# Patient Record
Sex: MALE | Race: WHITE | Employment: FULL TIME | URBAN - METROPOLITAN AREA
[De-identification: names, ages, dates, MRNs, and addresses within clinical notes are randomized per-mention and may not be internally consistent; named-entity substitution may affect disease eponyms.]

---

## 2019-06-27 ENCOUNTER — APPOINTMENT (OUTPATIENT)
Dept: CT IMAGING | Facility: CLINIC | Age: 34
End: 2019-06-27
Attending: EMERGENCY MEDICINE
Payer: COMMERCIAL

## 2019-06-27 ENCOUNTER — APPOINTMENT (OUTPATIENT)
Dept: ULTRASOUND IMAGING | Facility: CLINIC | Age: 34
End: 2019-06-27
Attending: EMERGENCY MEDICINE
Payer: COMMERCIAL

## 2019-06-27 ENCOUNTER — HOSPITAL ENCOUNTER (EMERGENCY)
Facility: CLINIC | Age: 34
Discharge: HOME | End: 2019-06-27
Attending: EMERGENCY MEDICINE | Admitting: EMERGENCY MEDICINE
Payer: COMMERCIAL

## 2019-06-27 VITALS
RESPIRATION RATE: 11 BRPM | OXYGEN SATURATION: 99 % | DIASTOLIC BLOOD PRESSURE: 97 MMHG | HEIGHT: 70 IN | BODY MASS INDEX: 45.1 KG/M2 | WEIGHT: 315 LBS | TEMPERATURE: 98.8 F | SYSTOLIC BLOOD PRESSURE: 138 MMHG

## 2019-06-27 DIAGNOSIS — R91.8 PULMONARY NODULES: ICD-10-CM

## 2019-06-27 DIAGNOSIS — R07.9 CHEST PAIN, UNSPECIFIED TYPE: ICD-10-CM

## 2019-06-27 LAB
ANION GAP SERPL CALCULATED.3IONS-SCNC: 7 MMOL/L (ref 3–14)
BASOPHILS # BLD AUTO: 0 10E9/L (ref 0–0.2)
BASOPHILS NFR BLD AUTO: 0.4 %
BUN SERPL-MCNC: 12 MG/DL (ref 7–30)
CALCIUM SERPL-MCNC: 9.5 MG/DL (ref 8.5–10.1)
CHLORIDE SERPL-SCNC: 109 MMOL/L (ref 94–109)
CO2 SERPL-SCNC: 28 MMOL/L (ref 20–32)
CREAT SERPL-MCNC: 1.07 MG/DL (ref 0.66–1.25)
D DIMER PPP FEU-MCNC: 0.7 UG/ML FEU (ref 0–0.5)
DIFFERENTIAL METHOD BLD: NORMAL
EOSINOPHIL # BLD AUTO: 0.1 10E9/L (ref 0–0.7)
EOSINOPHIL NFR BLD AUTO: 1.3 %
ERYTHROCYTE [DISTWIDTH] IN BLOOD BY AUTOMATED COUNT: 12.3 % (ref 10–15)
GFR SERPL CREATININE-BSD FRML MDRD: 89 ML/MIN/{1.73_M2}
GLUCOSE SERPL-MCNC: 105 MG/DL (ref 70–99)
HCT VFR BLD AUTO: 45 % (ref 40–53)
HGB BLD-MCNC: 15.8 G/DL (ref 13.3–17.7)
IMM GRANULOCYTES # BLD: 0 10E9/L (ref 0–0.4)
IMM GRANULOCYTES NFR BLD: 0.1 %
INTERPRETATION ECG - MUSE: NORMAL
LYMPHOCYTES # BLD AUTO: 4.3 10E9/L (ref 0.8–5.3)
LYMPHOCYTES NFR BLD AUTO: 45.7 %
MCH RBC QN AUTO: 32.1 PG (ref 26.5–33)
MCHC RBC AUTO-ENTMCNC: 35.1 G/DL (ref 31.5–36.5)
MCV RBC AUTO: 92 FL (ref 78–100)
MONOCYTES # BLD AUTO: 0.8 10E9/L (ref 0–1.3)
MONOCYTES NFR BLD AUTO: 8.3 %
NEUTROPHILS # BLD AUTO: 4.2 10E9/L (ref 1.6–8.3)
NEUTROPHILS NFR BLD AUTO: 44.2 %
NRBC # BLD AUTO: 0 10*3/UL
NRBC BLD AUTO-RTO: 0 /100
NT-PROBNP SERPL-MCNC: 32 PG/ML (ref 0–450)
PLATELET # BLD AUTO: 215 10E9/L (ref 150–450)
POTASSIUM SERPL-SCNC: 4 MMOL/L (ref 3.4–5.3)
RBC # BLD AUTO: 4.92 10E12/L (ref 4.4–5.9)
SODIUM SERPL-SCNC: 144 MMOL/L (ref 133–144)
TROPONIN I SERPL-MCNC: <0.015 UG/L (ref 0–0.04)
WBC # BLD AUTO: 9.4 10E9/L (ref 4–11)

## 2019-06-27 PROCEDURE — 99285 EMERGENCY DEPT VISIT HI MDM: CPT | Mod: 25

## 2019-06-27 PROCEDURE — 25000125 ZZHC RX 250: Performed by: EMERGENCY MEDICINE

## 2019-06-27 PROCEDURE — 84484 ASSAY OF TROPONIN QUANT: CPT | Performed by: EMERGENCY MEDICINE

## 2019-06-27 PROCEDURE — 93971 EXTREMITY STUDY: CPT | Mod: RT

## 2019-06-27 PROCEDURE — 85025 COMPLETE CBC W/AUTO DIFF WBC: CPT | Performed by: EMERGENCY MEDICINE

## 2019-06-27 PROCEDURE — 71260 CT THORAX DX C+: CPT

## 2019-06-27 PROCEDURE — 80048 BASIC METABOLIC PNL TOTAL CA: CPT | Performed by: EMERGENCY MEDICINE

## 2019-06-27 PROCEDURE — 85379 FIBRIN DEGRADATION QUANT: CPT | Performed by: EMERGENCY MEDICINE

## 2019-06-27 PROCEDURE — 25000128 H RX IP 250 OP 636: Performed by: EMERGENCY MEDICINE

## 2019-06-27 PROCEDURE — 93005 ELECTROCARDIOGRAM TRACING: CPT

## 2019-06-27 PROCEDURE — 83880 ASSAY OF NATRIURETIC PEPTIDE: CPT | Performed by: EMERGENCY MEDICINE

## 2019-06-27 RX ORDER — IOPAMIDOL 755 MG/ML
83 INJECTION, SOLUTION INTRAVASCULAR ONCE
Status: COMPLETED | OUTPATIENT
Start: 2019-06-27 | End: 2019-06-27

## 2019-06-27 RX ADMIN — SODIUM CHLORIDE 100 ML: 9 INJECTION, SOLUTION INTRAVENOUS at 22:20

## 2019-06-27 RX ADMIN — IOPAMIDOL 83 ML: 755 INJECTION, SOLUTION INTRAVENOUS at 22:20

## 2019-06-27 ASSESSMENT — ENCOUNTER SYMPTOMS
BACK PAIN: 1
MYALGIAS: 1
NUMBNESS: 0
NECK PAIN: 1
WEAKNESS: 1

## 2019-06-27 ASSESSMENT — MIFFLIN-ST. JEOR: SCORE: 2370.08

## 2019-06-27 NOTE — ED AVS SNAPSHOT
Emergency Department  [address] [place] [address]  Phone:  [phone]  Fax:  [phone]                                    Americo Griffin   MRN: [id number]    Department:   Emergency Department   Date of Visit:  6/27/2019           After Visit Summary Signature Page    I have received my discharge instructions, and my questions have been answered. I have discussed any challenges I see with this plan with the nurse or doctor.    ..........................................................................................................................................  Patient/Patient Representative Signature      ..........................................................................................................................................  Patient Representative Print Name and Relationship to Patient    ..................................................               ................................................  Date                                   Time    ..........................................................................................................................................  Reviewed by Signature/Title    ...................................................              ..............................................  Date                                               Time          22EPIC Rev 08/18

## 2019-06-28 NOTE — ED PROVIDER NOTES
"  History     Chief Complaint:    Shortness of Breath and Chest Pain      HPI   Americo Griffin is a 33 year old male who presents to the ED for evaluation of shortness of breath and chest pain. The patient states that he has been having constant right upper chest pain, right arm pain, right-sided neck pain, right-sided shoulder pain, right-sided neck pain, right-sided back pain, and intermittent weakness in his right hand for a couple weeks. He also reported that he his right arm turned blue at one point, however, his chiropractor was able to resolve this. Today around 1600 after getting home from work, he states that he also developed shortness of breath. Given the new onset shortness of breath and persistent myalgias, he presented to the ED for evaluation as he is concerned he may have a thoracic outlet syndrome and/or a blood clot. He also notes a persistent cough for several weeks and bilateral foot swelling for the past two weeks. He otherwise denies any numbness. He also denies any recent travel, surgery, or history of heart disease, DVT, or PE.     Allergies:  The patient has no known drug allergies.    Medications:    The patient is currently on no regular medications.    Past Medical History:    Depression    Past Surgical History:    No past pertinent family history.    Family History:    Former smoker    Social History:  Former smoker.  Positive for alcohol use.   Marital Status:  Single     Review of Systems   Musculoskeletal: Positive for back pain, myalgias and neck pain.   Neurological: Positive for weakness. Negative for numbness.   All other systems reviewed and are negative.    Physical Exam   First Vitals:  BP: (!) 138/97  Heart Rate: 93  Temp: 98.8  F (37.1  C)  Resp: 18  Height: 177.8 cm (5' 10\")  Weight: 142.9 kg (315 lb)  SpO2: 100 %    Physical Exam    General: Resting comfortably on the gurney  Eyes:  The pupils are equal and round    Conjunctivae and sclerae are normal  ENT:    Moist mucous " membranes  Neck:  Normal range of motion  CV:  Regular rate and rhythm    Skin warm and well perfused     Radial pulses 2+ bilaterally  Resp:  Lungs are clear    Non-labored    No rales    No wheezing   GI:  Abdomen is soft, there is no rigidity    No distension    No rebound tenderness     No abdominal tenderness  MS:  Mild swelling of right UE compared to left; no swelling on bilateral LE, bilateral LE appear symmetric size. Tender on right lateral neck, right upper shoulder  Skin:  No color change on UE  Neuro:   Awake, alert.      Speech is normal and fluent.    Face is symmetric.     Moves all extremities equally, no arm weakness    SILT on right UE  Psych: Normal affect.  Appropriate interactions.  Emergency Department Course   ECG:  Indication: chest pain  Time: 2012  Vent. Rate 101 bpm. NH interval 120. QRS duration 86. QT/QTc 342/443. P-R-T axis 60 20 52.  Sinus tachycardia. Otherwise normal ECG. Read time: 2033    Imaging:  Radiographic findings were communicated with the patient who voiced understanding of the findings.  CT Chest Pulmonary embolism w/ contrast   1. There is no pulmonary embolus, aortic aneurysm or dissection.  2. Two small right lung nodules as per radiology.     Laboratory:  CBC: WBC: 9.4, HGB: 15.8, PLT: 215  BMP: Glucose 105 (H), o/w WNL (Creatinine: 1.07)    2032 Troponin: <0.015  D dimer: 0.7 (H)  BNP 32    Emergency Department Course:  Nursing notes and vitals reviewed. (2037) I performed an exam of the patient as documented above.     EKG obtained in the ED, see results above.     IV inserted. Blood drawn. This was sent to the lab for further testing, results above.     The patient was sent for a chest CT while in the emergency department, findings above.     2350 I rechecked the patient and discussed the results of his workup thus far.     Findings and plan explained to the Patient. Patient discharged home with instructions regarding supportive care, medications, and reasons to  return. The importance of close follow-up was reviewed.    I personally reviewed the laboratory results with the Patient and answered all related questions prior to discharge.   Impression & Plan    Medical Decision Making:  Americo Griffin is a 33-year-old male who presented to the emergency department with shortness of breath and chest pain.  Patient neurovascularly intact.  Equal pulses bilaterally.  He has concerns about a possible PE.  Labs show mildly elevated d-dimer so CT chest performed that shows no PE.  Vessels appear unremarkable.  Does have 2 lung nodules which were discussed with patient and recommend follow-up with primary care doctor for these.  Ultrasound of right upper extremity also obtained and negative for DVT.  Do think his pain is likely due to orthopedic concerns that he has been having for some time.  Has follow-up with orthopedics on Monday.  No evidence of arterial occlusion on right upper extremity.  Unlikely ACS, dissection.  Troponin is negative.  Do not think other imaging is indicated such as MRI cervical spine and do think further work-up can be done through his orthopedic provider.      Diagnosis:    ICD-10-CM    1. Pulmonary nodules R91.8    2. Chest pain, unspecified type R07.9        Disposition:  discharged to home    Scribe Disclosure:  I,  Bobby Tellez, am serving as a scribe on 6/27/2019 at 8: 37 PM to personally document services performed by Xiao Finney MD based on my observations and the provider's statements to me.        Bobby Tellez  6/27/2019    EMERGENCY DEPARTMENT       Xiao Finney MD  06/28/19 0055

## 2019-06-28 NOTE — DISCHARGE INSTRUCTIONS
Follow up with orthopedics on Monday  Follow up with primary care provider about lung nodule; recommendation would be to consider CT chest in one year to make sure that lung nodules are not changing

## 2019-07-19 ENCOUNTER — HOSPITAL ENCOUNTER (EMERGENCY)
Facility: CLINIC | Age: 34
Discharge: HOME | End: 2019-07-19
Attending: EMERGENCY MEDICINE | Admitting: EMERGENCY MEDICINE
Payer: COMMERCIAL

## 2019-07-19 ENCOUNTER — APPOINTMENT (OUTPATIENT)
Dept: ULTRASOUND IMAGING | Facility: CLINIC | Age: 34
End: 2019-07-19
Attending: EMERGENCY MEDICINE
Payer: COMMERCIAL

## 2019-07-19 VITALS
DIASTOLIC BLOOD PRESSURE: 99 MMHG | TEMPERATURE: 98.5 F | OXYGEN SATURATION: 98 % | HEIGHT: 70 IN | BODY MASS INDEX: 42.95 KG/M2 | SYSTOLIC BLOOD PRESSURE: 165 MMHG | HEART RATE: 80 BPM | WEIGHT: 300 LBS | RESPIRATION RATE: 20 BRPM

## 2019-07-19 DIAGNOSIS — I86.1 VARICOCELE: ICD-10-CM

## 2019-07-19 DIAGNOSIS — N43.3 HYDROCELE, UNSPECIFIED HYDROCELE TYPE: ICD-10-CM

## 2019-07-19 LAB
ALBUMIN UR-MCNC: NEGATIVE MG/DL
APPEARANCE UR: CLEAR
BILIRUB UR QL STRIP: NEGATIVE
COLOR UR AUTO: YELLOW
GLUCOSE UR STRIP-MCNC: NEGATIVE MG/DL
HGB UR QL STRIP: NEGATIVE
KETONES UR STRIP-MCNC: NEGATIVE MG/DL
LEUKOCYTE ESTERASE UR QL STRIP: NEGATIVE
NITRATE UR QL: NEGATIVE
PH UR STRIP: 6.5 PH (ref 5–7)
RBC #/AREA URNS AUTO: 0 /HPF (ref 0–2)
SOURCE: NORMAL
SP GR UR STRIP: 1.02 (ref 1–1.03)
SQUAMOUS #/AREA URNS AUTO: <1 /HPF (ref 0–1)
UROBILINOGEN UR STRIP-MCNC: NORMAL MG/DL (ref 0–2)
WBC #/AREA URNS AUTO: 1 /HPF (ref 0–5)

## 2019-07-19 PROCEDURE — 87591 N.GONORRHOEAE DNA AMP PROB: CPT | Performed by: EMERGENCY MEDICINE

## 2019-07-19 PROCEDURE — 99284 EMERGENCY DEPT VISIT MOD MDM: CPT | Mod: 25

## 2019-07-19 PROCEDURE — 87491 CHLMYD TRACH DNA AMP PROBE: CPT | Performed by: EMERGENCY MEDICINE

## 2019-07-19 PROCEDURE — 93976 VASCULAR STUDY: CPT

## 2019-07-19 PROCEDURE — 81001 URINALYSIS AUTO W/SCOPE: CPT | Performed by: EMERGENCY MEDICINE

## 2019-07-19 PROCEDURE — 25000132 ZZH RX MED GY IP 250 OP 250 PS 637: Performed by: EMERGENCY MEDICINE

## 2019-07-19 RX ORDER — IBUPROFEN 600 MG/1
600 TABLET, FILM COATED ORAL ONCE
Status: COMPLETED | OUTPATIENT
Start: 2019-07-19 | End: 2019-07-19

## 2019-07-19 RX ADMIN — IBUPROFEN 600 MG: 600 TABLET ORAL at 16:06

## 2019-07-19 ASSESSMENT — MIFFLIN-ST. JEOR: SCORE: 2302.04

## 2019-07-19 ASSESSMENT — ENCOUNTER SYMPTOMS
FEVER: 0
CONSTIPATION: 0
BLOOD IN STOOL: 0
DIARRHEA: 0
ABDOMINAL PAIN: 1

## 2019-07-19 NOTE — ED PROVIDER NOTES
"  History     Chief Complaint:  Testicular pain    HPI   Americo Griffin is a 33 year old male who presents with testicular pain. The patient reports that for about three years he has had a sensation of a string pulling at his testicles. He states this is associated with pain in both testicles, which radiates in to his right groin and right low abdomen. He believed this was associated with masturbation and so was not concerned about it. However, his Erectile dysfunction which he has experienced intermittently since the age of 14 has worsened to the point where he is no longer able to masturbate and is not sure if he would If he could because he thinks it may be contributing to his pain, because he was doing this every day. He also notes that whenever he engages in a sexual act he noticed strange odors from his penis. He states he has a new girlfriend and so is more concerned about this recently, prompting his evaluation here. He states he is still experiencing pain. He denies PO changes, bowel movement abnormalities, fevers, or attempting to manage his pain with any analgesics.     Allergies:  NKDA    Medications:    Albuterol  Risperdal    Past Medical History:    Depression  Psychosis    Past Surgical History:    The patient does not have any pertinent past surgical history.    Family History:    No past pertinent family history.    Social History:  Former smoker  Positive for alcohol use.       Review of Systems   Constitutional: Negative for fever.   Gastrointestinal: Positive for abdominal pain. Negative for blood in stool, constipation and diarrhea.   Genitourinary: Positive for testicular pain.   All other systems reviewed and are negative.      Physical Exam     Patient Vitals for the past 24 hrs:   BP Temp Temp src Heart Rate Resp SpO2 Height Weight   07/19/19 1539 (!) 165/99 98.5  F (36.9  C) Temporal 92 16 100 % 1.778 m (5' 10\") 136.1 kg (300 lb)         Physical Exam  Constitutional: Alert, attentive, GCS " 15  HENT:    Nose: Nose normal.    Mouth/Throat: Oropharynx is clear, mucous membranes are moist  Eyes: EOM are normal, anicteric, conjugate gaze  CV: regular rate and rhythm; no murmurs  Chest: Effort normal and breath sounds clear without wheezing or rales, symmetric bilaterally   GI:  non tender. No distension. No guarding or rebound.    : :uncircumscized phallus   Normal descended testicles   Cremasteric reflex intact bilaterally   No testicular or epididymal tenderness or mass appreciated   No inguinal hernia appreciated   No penile discharge  MSK: No LE edema, no tenderness to palpation of BLE.  Neurological: Alert, attentive, moving all extremities equally.   Skin: Skin is warm and dry.      Emergency Department Course     Imaging:  Radiographic findings were communicated with the patient who voiced understanding of the findings.    US testicular and scrotum  1. Testicles unremarkable.  2. Moderate left varicocele with trace hydrocele. as per radiology    Laboratory:    UA with Microscopic: All  WNL  Chlamydia PCR: PENDING  Gonorrhea PCR: PENDING    Interventions:    1606 Ibuprofen 600 mg Oral      Emergency Department Course:  Nursing notes and vitals reviewed. (7808) I performed an exam of the patient as documented above.     IV inserted. Medicine administered as documented above. Blood drawn. This was sent to the lab for further testing, results above.    The patient was sent for a US testicular and scrotum while in the emergency department, findings above.     (1455) I rechecked the patient and discussed the results of his workup thus far.     Findings and plan explained to the Patient. Patient discharged home with instructions regarding supportive care, medications, and reasons to return. The importance of close follow-up was reviewed.     I personally reviewed the laboratory results with the Patient and answered all related questions prior to discharge.       Impression & Plan    Medical Decision  [place]akin-year-old male with past medical history significant for depression presenting for evaluation of several years in her bilateral testicular pain he describes as stringing between them.  Vital signs notable for elevated blood pressure, otherwise within normal limits.  On exam clinically have low suspicion for testicular torsion, he has no testicular swelling, with normal lie of his testicles.  He does have mild left testicular tenderness without evidence of inguinal hernia.  Did elect to proceed with ultrasound of the testicles which shows left-sided varicocele does not associate hydrocele.  UA negative, GC chlamydia sent the low suspicion for orchitis at this time given duration of his symptoms.  He is scheduled to follow-up with urology in a week's time and I feel he is safe for continued outpatient evaluation and discussion of management of varicocele as I do feel this is likely cause of his pain given his description.  I recommended supportive underwear, altering dose of Tylenol, ibuprofen and abstinence until follow-up with urology.  He does endorse daily masturbation.    Diagnosis:    ICD-10-CM    1. Varicocele I86.1     Left   2. Hydrocele, unspecified hydrocele type N43.3        Disposition:  discharged to home  Pavel Carmona MD   [employer]  5:42 PM 19     Scribe Disclosure:  I, Hilton Resendiz, am serving as a scribe on 2019 at 3:43 PM to personally document services performed by Pavel aCrmona MD based on my observations and the provider's statements to me.     Hilton Resendiz  2019    EMERGENCY DEPARTMENT       Pavel Carmona MD  [phone]

## 2019-07-19 NOTE — DISCHARGE INSTRUCTIONS
He should take Tylenol and ibuprofen as needed for the pain in your testicle.  He should wear supportive underwear and avoid activities that make your pain worse.  You should return the emergency department should you have severe worsening pain or unable to urinate.

## 2019-07-19 NOTE — ED AVS SNAPSHOT
Emergency Department  [address] [place] [address]  Phone:  [phone]  Fax:  [phone]                                    Americo Griffin   MRN: [id number]    Department:   Emergency Department   Date of Visit:  7/19/2019           After Visit Summary Signature Page    I have received my discharge instructions, and my questions have been answered. I have discussed any challenges I see with this plan with the nurse or doctor.    ..........................................................................................................................................  Patient/Patient Representative Signature      ..........................................................................................................................................  Patient Representative Print Name and Relationship to Patient    ..................................................               ................................................  Date                                   Time    ..........................................................................................................................................  Reviewed by Signature/Title    ...................................................              ..............................................  Date                                               Time          22EPIC Rev 08/18

## 2019-07-19 NOTE — ED TRIAGE NOTES
"\"feels like a string up between my balls that feels like its pulling\" Bilateral scrotal pain and lower abdominal pain. Dealing with this for a couple of years off and on.   "

## 2019-07-21 LAB
C TRACH DNA SPEC QL NAA+PROBE: NEGATIVE
N GONORRHOEA DNA SPEC QL NAA+PROBE: NEGATIVE
SPECIMEN SOURCE: NORMAL
SPECIMEN SOURCE: NORMAL

## 2019-12-04 ENCOUNTER — OFFICE VISIT (OUTPATIENT)
Dept: DERMATOLOGY | Facility: CLINIC | Age: 34
End: 2019-12-04
Payer: COMMERCIAL

## 2019-12-04 VITALS — DIASTOLIC BLOOD PRESSURE: 79 MMHG | HEART RATE: 91 BPM | SYSTOLIC BLOOD PRESSURE: 122 MMHG

## 2019-12-04 DIAGNOSIS — L85.8 KP (KERATOSIS PILARIS): ICD-10-CM

## 2019-12-04 DIAGNOSIS — L85.3 XEROSIS OF SKIN: ICD-10-CM

## 2019-12-04 DIAGNOSIS — L30.9 DERMATITIS: Primary | ICD-10-CM

## 2019-12-04 DIAGNOSIS — R58 ECCHYMOSIS: ICD-10-CM

## 2019-12-04 DIAGNOSIS — L29.9 LOCALIZED PRURITUS: ICD-10-CM

## 2019-12-04 PROCEDURE — 99203 OFFICE O/P NEW LOW 30 MIN: CPT | Performed by: PHYSICIAN ASSISTANT

## 2019-12-04 RX ORDER — FLUOCINOLONE ACETONIDE 0.1 MG/ML
SOLUTION TOPICAL
Qty: 60 ML | Refills: 0 | Status: SHIPPED | OUTPATIENT
Start: 2019-12-04

## 2019-12-04 RX ORDER — TRIAMCINOLONE ACETONIDE 5 MG/G
CREAM TOPICAL
Qty: 60 G | Refills: 0 | Status: SHIPPED | OUTPATIENT
Start: 2019-12-04

## 2019-12-04 NOTE — LETTER
"    12/4/2019         RE: Americo Griffin  [address]  [place] [address]        Dear Colleague,    Thank you for referring your patient, Americo Griffin, to the [facility]. Please see a copy of my visit note below.    HPI:  Americo Griffin is a 34 year old male patient here today for redness on scalp .  Patient states this has been present for a eyar  Patient reports the following symptoms: maybe itchy? \"just looks funn\" .  Patient reports the following previous treatments: none.  Patient reports the following modifying factors: none.  Associated symptoms: none. Also has dry itch spots on abdomen and UE for 1 year. No tx tried.  Patient has no other skin complaints today.  Remainder of the HPI, Meds, PMH, Allergies, FH, and SH was reviewed in chart.      Past Medical History:   Diagnosis Date     Depressive disorder        History reviewed. No pertinent surgical history.     History reviewed. No pertinent family history.    Social History     Socioeconomic History     Marital status: Single     Spouse name: Not on file     Number of children: Not on file     Years of education: Not on file     Highest education level: Not on file   Occupational History     Not on file   Social Needs     Financial resource strain: Not on file     Food insecurity:     Worry: Not on file     Inability: Not on file     Transportation needs:     Medical: Not on file     Non-medical: Not on file   Tobacco Use     Smoking status: Former Smoker     Smokeless tobacco: Never Used   Substance and Sexual Activity     Alcohol use: Yes     Comment: x3 /week last drink was last night     Drug use: No     Sexual activity: Never   Lifestyle     Physical activity:     Days per week: Not on file     Minutes per session: Not on file     Stress: Not on file   Relationships     Social connections:     Talks on phone: Not on file     Gets together: Not on file     Attends Yazidism service: Not on file     " Active member of club or organization: Not on file     Attends meetings of clubs or organizations: Not on file     Relationship status: Not on file     Intimate partner violence:     Fear of current or ex partner: Not on file     Emotionally abused: Not on file     Physically abused: Not on file     Forced sexual activity: Not on file   Other Topics Concern     Not on file   Social History Narrative     Not on file       Outpatient Encounter Medications as of 12/4/2019   Medication Sig Dispense Refill     fluocinolone (SYNALAR) 0.01 % solution Apply a thin layer to affected area on scalp BID x 4 weeks, tapering with improvement. Do not apply to face or body folds. 60 mL 0     triamcinolone (ARISTOCORT HP) 0.5 % external cream Apply a thin layer to affected area on arms and abdomen BID x 2 weeks, tapering with improvement. Do not apply to face or body folds. 60 g 0     albuterol (PROAIR HFA/PROVENTIL HFA/VENTOLIN HFA) 108 (90 BASE) MCG/ACT Inhaler Inhale 2 puffs into the lungs every 6 hours as needed for shortness of breath / dyspnea or wheezing       risperiDONE (RISPERDAL) 2 MG tablet Take 1 tablet (2 mg) by mouth At Bedtime (Patient not taking: Reported on 12/4/2019) 30 tablet 0     No facility-administered encounter medications on file as of 12/4/2019.        Review Of Systems:  Skin: As above  Eyes: negative  Ears/Nose/Throat: negative  Respiratory: No shortness of breath, dyspnea on exertion, cough, or hemoptysis  Cardiovascular: negative  Gastrointestinal: negative  Genitourinary: negative  Musculoskeletal: negative  Neurologic: negative  Psychiatric: negative  Hematologic/Lymphatic/Immunologic: negative  Endocrine: negative      Objective:     /79   Pulse 91   Eyes: Conjunctivae/lids: Normal   ENT: Lips:  Normal  MSK: Normal  Cardiovascular: Peripheral edema none  Pulm: Breathing Normal  Neuro/Psych: Orientation: Normal; Mood/Affect: Normal, NAD, WDWN  Pt accompanied by: self  Following areas examined:  face, neck, abdomen, LUE, hands  Johnson skin type:i   Findings:  Pink scaly excoriated patches on abdomen  Red/purple linear patches x 2 on left ventral forearm  Pink smooth macules and patches on scalp  Flesh and red 1mm scaly papules on left lateral arm  3 pink papule on right forearm  Assessment and Plan:  1) Keratosis Pilaris:    Is a genetic rash that is considered common. Prevalent on the arms, upper legs, and  cheeks, it looks like small bumps. There is no cure, but there are some topical creams  that will help smooth out the bumps.   Wash body daily with a gentle cleanser( Dove, Cetaphil, or Vanicream) or just friction and water (use soap on underarms, groin, and feet).  Apply a thick emollient at least once a day. If showering apply emollient within 2-3 minutes of showering.   Consider AmLactin or Eucerin Plus twice daily(can purchase over-the-counter) as a moisturizer to affected area.  Begin Tretinoin to affected area nightly. Can be very drying so make sure to moisturize.  Okay to use topical steroid on inflamed areas 2x a day for a short period of time (2-3 days). This can calm down some of the redness.  Side effects of topical steroids including but not limited to atrophy (skin thinning), striae (stretch marks) telangiectasias, steroid acne, and others. Do not apply to normal skin. Do not apply to discolored skin that does not have rash present.     2) dermatitis of scalp  Apply a thin layer of lidex to affected area 2x a day for 4 weeks. Tapering with improvement. Do not use on face or body folds.  Discussed side effects of topical steroids including but not limited to atrophy (skin thinning), striae (stretch marks) telangiectasias, steroid acne, and others. Do not apply to normal skin. Do not apply to discolored skin that does not have rash present. Educated patient on post inflammatory hyperpigmentation.     3) dermatitis of abdomen and right and localized pruritis  +/- contact dermatitis of  right forearm  TAC to both body areas.   Apply a thin layer of TAC to affected area 2x a day for 1-2 weeks. Tapering with improvement. Do not use on face or body folds.  Discussed side effects of topical steroids including but not limited to atrophy (skin thinning), striae (stretch marks) telangiectasias, steroid acne, and others. Do not apply to normal skin. Do not apply to discolored skin that does not have rash present. Educated patient on post inflammatory hyperpigmentation.     4) ecchymosis  Watch and monitor    5) xerosis of skin  Proper skin care from [facility]:    -Eliminate harsh soaps as they strip the natural oils from the skin, often resulting in dry itchy skin ( i.e. Dial, Zest, Kaylee Spring)  -Use mild soaps such as Cetaphil or Dove Sensitive Skin in the shower. You do not need to use soap on arms, legs, and trunk every time you shower unless visibly soiled.   -Avoid hot or cold showers.  -After showering, lightly dry off and apply moisturizing within 2-3 minutes. This will help trap moisture in the skin.   -Aggressive use of a moisturizer at least 1-2 times a day to the entire body (including -Vanicream, Cetaphil, Aquaphor or Cerave) and moisturize hands after every washing.  -We recommend using moisturizers that come in a tub that needs to be scooped out, not a pump. This has more of an oil base. It will hold moisture in your skin much better than a water base moisturizer. The above recommended are non-pore clogging.         Follow up in 2-3 weeks      Again, thank you for allowing me to participate in the care of your patient.        Sincerely,        Rayna Medina PA-C

## 2019-12-04 NOTE — PROGRESS NOTES
"HPI:  Americo Griffin is a 34 year old male patient here today for redness on scalp .  Patient states this has been present for a eyar  Patient reports the following symptoms: maybe itchy? \"just looks funny\" .  Patient reports the following previous treatments: none.  Patient reports the following modifying factors: none.  Associated symptoms: none. Also has dry itch spots on abdomen and UE for 1 year. No tx tried.  Patient has no other skin complaints today.  Remainder of the HPI, Meds, PMH, Allergies, FH, and SH was reviewed in chart.      Past Medical History:   Diagnosis Date     Depressive disorder        History reviewed. No pertinent surgical history.     History reviewed. No pertinent family history.    Social History     Socioeconomic History     Marital status: Single     Spouse name: Not on file     Number of children: Not on file     Years of education: Not on file     Highest education level: Not on file   Occupational History     Not on file   Social Needs     Financial resource strain: Not on file     Food insecurity:     Worry: Not on file     Inability: Not on file     Transportation needs:     Medical: Not on file     Non-medical: Not on file   Tobacco Use     Smoking status: Former Smoker     Smokeless tobacco: Never Used   Substance and Sexual Activity     Alcohol use: Yes     Comment: x3 /week last drink was last night     Drug use: No     Sexual activity: Never   Lifestyle     Physical activity:     Days per week: Not on file     Minutes per session: Not on file     Stress: Not on file   Relationships     Social connections:     Talks on phone: Not on file     Gets together: Not on file     Attends Gnosticist service: Not on file     Active member of club or organization: Not on file     Attends meetings of clubs or organizations: Not on file     Relationship status: Not on file     Intimate partner violence:     Fear of current or ex partner: Not on file     Emotionally abused: Not on file     " Physically abused: Not on file     Forced sexual activity: Not on file   Other Topics Concern     Not on file   Social History Narrative     Not on file       Outpatient Encounter Medications as of 12/4/2019   Medication Sig Dispense Refill     fluocinolone (SYNALAR) 0.01 % solution Apply a thin layer to affected area on scalp BID x 4 weeks, tapering with improvement. Do not apply to face or body folds. 60 mL 0     triamcinolone (ARISTOCORT HP) 0.5 % external cream Apply a thin layer to affected area on arms and abdomen BID x 2 weeks, tapering with improvement. Do not apply to face or body folds. 60 g 0     albuterol (PROAIR HFA/PROVENTIL HFA/VENTOLIN HFA) 108 (90 BASE) MCG/ACT Inhaler Inhale 2 puffs into the lungs every 6 hours as needed for shortness of breath / dyspnea or wheezing       risperiDONE (RISPERDAL) 2 MG tablet Take 1 tablet (2 mg) by mouth At Bedtime (Patient not taking: Reported on 12/4/2019) 30 tablet 0     No facility-administered encounter medications on file as of 12/4/2019.        Review Of Systems:  Skin: As above  Eyes: negative  Ears/Nose/Throat: negative  Respiratory: No shortness of breath, dyspnea on exertion, cough, or hemoptysis  Cardiovascular: negative  Gastrointestinal: negative  Genitourinary: negative  Musculoskeletal: negative  Neurologic: negative  Psychiatric: negative  Hematologic/Lymphatic/Immunologic: negative  Endocrine: negative      Objective:     /79   Pulse 91   Eyes: Conjunctivae/lids: Normal   ENT: Lips:  Normal  MSK: Normal  Cardiovascular: Peripheral edema none  Pulm: Breathing Normal  Neuro/Psych: Orientation: Normal; Mood/Affect: Normal, NAD, WDWN  Pt accompanied by: self  Following areas examined: face, neck, abdomen, LUE, hands  Johnson skin type:i   Findings:  Pink scaly excoriated patches on abdomen  Red/purple linear patches x 2 on left ventral forearm  Pink smooth macules and patches on scalp  Flesh and red 1mm scaly papules on left lateral  arm    Assessment and Plan:  1) Keratosis Pilaris:    Is a genetic rash that is considered common. Prevalent on the arms, upper legs, and  cheeks, it looks like small bumps. There is no cure, but there are some topical creams  that will help smooth out the bumps.   Wash body daily with a gentle cleanser( Dove, Cetaphil, or Vanicream) or just friction and water (use soap on underarms, groin, and feet).  Apply a thick emollient at least once a day. If showering apply emollient within 2-3 minutes of showering.   Consider AmLactin or Eucerin Plus twice daily(can purchase over-the-counter) as a moisturizer to affected area.  Begin Tretinoin to affected area nightly. Can be very drying so make sure to moisturize.  Okay to use topical steroid on inflamed areas 2x a day for a short period of time (2-3 days). This can calm down some of the redness.  Side effects of topical steroids including but not limited to atrophy (skin thinning), striae (stretch marks) telangiectasias, steroid acne, and others. Do not apply to normal skin. Do not apply to discolored skin that does not have rash present.     2) dermatitis of scalp  Apply a thin layer of lidex to affected area 2x a day for 4 weeks. Tapering with improvement. Do not use on face or body folds.  Discussed side effects of topical steroids including but not limited to atrophy (skin thinning), striae (stretch marks) telangiectasias, steroid acne, and others. Do not apply to normal skin. Do not apply to discolored skin that does not have rash present. Educated patient on post inflammatory hyperpigmentation.     3) dermatitis of abdomen and  localized pruritis  Apply a thin layer of TAC to affected area 2x a day for 1-2 weeks. Tapering with improvement. Do not use on face or body folds.  Discussed side effects of topical steroids including but not limited to atrophy (skin thinning), striae (stretch marks) telangiectasias, steroid acne, and others. Do not apply to normal skin. Do  not apply to discolored skin that does not have rash present. Educated patient on post inflammatory hyperpigmentation.     4) ecchymosis  Watch and monitor    5) xerosis of skin  Proper skin care from [facility]:    -Eliminate harsh soaps as they strip the natural oils from the skin, often resulting in dry itchy skin ( i.e. Dial, Zest, [place] Spring)  -Use mild soaps such as Cetaphil or Dove Sensitive Skin in the shower. You do not need to use soap on arms, legs, and trunk every time you shower unless visibly soiled.   -Avoid hot or cold showers.  -After showering, lightly dry off and apply moisturizing within 2-3 minutes. This will help trap moisture in the skin.   -Aggressive use of a moisturizer at least 1-2 times a day to the entire body (including -Vanicream, Cetaphil, Aquaphor or Cerave) and moisturize hands after every washing.  -We recommend using moisturizers that come in a tub that needs to be scooped out, not a pump. This has more of an oil base. It will hold moisture in your skin much better than a water base moisturizer. The above recommended are non-pore clogging.         Follow up in 2-3 weeks

## 2019-12-04 NOTE — PATIENT INSTRUCTIONS
Proper skin care from [facility]:    -Eliminate harsh soaps as they strip the natural oils from the skin, often resulting in dry itchy skin ( i.e. Dial, Zest, Kaylee Spring)  -Use mild soaps such as Cetaphil or Dove Sensitive Skin in the shower. You do not need to use soap on arms, legs, and trunk every time you shower unless visibly soiled.   -Avoid hot or cold showers.  -After showering, lightly dry off and apply moisturizing within 2-3 minutes. This will help trap moisture in the skin.   -Aggressive use of a moisturizer at least 1-2 times a day to the entire body (including -Vanicream, Cetaphil, Aquaphor or Cerave) and moisturize hands after every washing.  -We recommend using moisturizers that come in a tub that needs to be scooped out, not a pump. This has more of an oil base. It will hold moisture in your skin much better than a water base moisturizer. The above recommended are non-pore clogging.      Wear a sunscreen with at least SPF 30 on your face, ears, neck and V of the chest daily. Wear sunscreen on other areas of the body if those areas are exposed to the sun throughout the day. Sunscreens can contain physical and/or chemical blockers. Physical blockers are less likely to clog pores, these include zinc oxide and titanium dioxide. Reapply every two hour and after swimming. Sunscreen examples include Neutrogena, CeraVe, Blue Lizard, Elta MD and many others.    UV radiation  UVA radiation remains constant throughout the day and throughout the year. It is a longer wavelength than UVB and therefore penetrates deeper into the skin leading to immediate and delayed tanning, photoaging, and skin cancer. 70-80% of UVA and UVB radiation occurs between the hours of 10am-2pm.  UVB radiation  UVB radiation causes the most harmful effects and is more significant during the summer months. However, snow and ice can reflect UVB radiation leading to skin damage during the winter months as well. UVB radiation is  responsible for tanning, burning, inflammation, delayed erythema (pinkness), pigmentation (brown spots), and skin cancer.

## 2021-04-11 ENCOUNTER — HOSPITAL ENCOUNTER (EMERGENCY)
Facility: CLINIC | Age: 36
Discharge: HOME | End: 2021-04-11
Attending: EMERGENCY MEDICINE | Admitting: EMERGENCY MEDICINE

## 2021-04-11 VITALS
TEMPERATURE: 97 F | SYSTOLIC BLOOD PRESSURE: 117 MMHG | DIASTOLIC BLOOD PRESSURE: 64 MMHG | HEART RATE: 78 BPM | BODY MASS INDEX: 40.6 KG/M2 | OXYGEN SATURATION: 99 % | WEIGHT: 290 LBS | HEIGHT: 71 IN | RESPIRATION RATE: 16 BRPM

## 2021-04-11 DIAGNOSIS — L02.212 CUTANEOUS ABSCESS OF BACK EXCLUDING BUTTOCKS: ICD-10-CM

## 2021-04-11 DIAGNOSIS — L72.3 SEBACEOUS CYST: ICD-10-CM

## 2021-04-11 PROCEDURE — 87186 SC STD MICRODIL/AGAR DIL: CPT | Performed by: EMERGENCY MEDICINE

## 2021-04-11 PROCEDURE — 87077 CULTURE AEROBIC IDENTIFY: CPT | Performed by: EMERGENCY MEDICINE

## 2021-04-11 PROCEDURE — 87070 CULTURE OTHR SPECIMN AEROBIC: CPT | Performed by: EMERGENCY MEDICINE

## 2021-04-11 PROCEDURE — 10061 I&D ABSCESS COMP/MULTIPLE: CPT

## 2021-04-11 PROCEDURE — 99152 MOD SED SAME PHYS/QHP 5/>YRS: CPT

## 2021-04-11 PROCEDURE — 250N000013 HC RX MED GY IP 250 OP 250 PS 637: Performed by: EMERGENCY MEDICINE

## 2021-04-11 PROCEDURE — 250N000011 HC RX IP 250 OP 636: Performed by: EMERGENCY MEDICINE

## 2021-04-11 PROCEDURE — 76705 ECHO EXAM OF ABDOMEN: CPT

## 2021-04-11 PROCEDURE — 999N000157 HC STATISTIC RCP TIME EA 10 MIN

## 2021-04-11 PROCEDURE — 99285 EMERGENCY DEPT VISIT HI MDM: CPT | Mod: 25

## 2021-04-11 RX ORDER — SULFAMETHOXAZOLE/TRIMETHOPRIM 800-160 MG
1 TABLET ORAL 2 TIMES DAILY
Qty: 14 TABLET | Refills: 0 | Status: SHIPPED | OUTPATIENT
Start: 2021-04-11 | End: 2021-04-18

## 2021-04-11 RX ORDER — FENTANYL CITRATE 50 UG/ML
INJECTION, SOLUTION INTRAMUSCULAR; INTRAVENOUS
Status: DISCONTINUED
Start: 2021-04-11 | End: 2021-04-11 | Stop reason: HOSPADM

## 2021-04-11 RX ORDER — CEPHALEXIN 500 MG/1
1000 CAPSULE ORAL ONCE
Status: COMPLETED | OUTPATIENT
Start: 2021-04-11 | End: 2021-04-11

## 2021-04-11 RX ORDER — FENTANYL CITRATE 50 UG/ML
100 INJECTION, SOLUTION INTRAMUSCULAR; INTRAVENOUS ONCE
Status: COMPLETED | OUTPATIENT
Start: 2021-04-11 | End: 2021-04-11

## 2021-04-11 RX ORDER — NALOXONE HYDROCHLORIDE 0.4 MG/ML
0.4 INJECTION, SOLUTION INTRAMUSCULAR; INTRAVENOUS; SUBCUTANEOUS
Status: DISCONTINUED | OUTPATIENT
Start: 2021-04-11 | End: 2021-04-11 | Stop reason: HOSPADM

## 2021-04-11 RX ORDER — FLUMAZENIL 0.1 MG/ML
0.2 INJECTION, SOLUTION INTRAVENOUS
Status: DISCONTINUED | OUTPATIENT
Start: 2021-04-11 | End: 2021-04-11 | Stop reason: HOSPADM

## 2021-04-11 RX ORDER — FENTANYL CITRATE 50 UG/ML
50 INJECTION, SOLUTION INTRAMUSCULAR; INTRAVENOUS ONCE
Status: COMPLETED | OUTPATIENT
Start: 2021-04-11 | End: 2021-04-11

## 2021-04-11 RX ORDER — NALOXONE HYDROCHLORIDE 0.4 MG/ML
0.2 INJECTION, SOLUTION INTRAMUSCULAR; INTRAVENOUS; SUBCUTANEOUS
Status: DISCONTINUED | OUTPATIENT
Start: 2021-04-11 | End: 2021-04-11 | Stop reason: HOSPADM

## 2021-04-11 RX ORDER — CEPHALEXIN 500 MG/1
500 CAPSULE ORAL 4 TIMES DAILY
Qty: 28 CAPSULE | Refills: 0 | Status: SHIPPED | OUTPATIENT
Start: 2021-04-11 | End: 2021-04-18

## 2021-04-11 RX ORDER — HYDROCODONE BITARTRATE AND ACETAMINOPHEN 5; 325 MG/1; MG/1
1 TABLET ORAL EVERY 6 HOURS PRN
Qty: 10 TABLET | Refills: 0 | Status: SHIPPED | OUTPATIENT
Start: 2021-04-11

## 2021-04-11 RX ADMIN — FENTANYL CITRATE 50 MCG: 50 INJECTION, SOLUTION INTRAMUSCULAR; INTRAVENOUS at 11:57

## 2021-04-11 RX ADMIN — MIDAZOLAM HYDROCHLORIDE 2 MG: 1 INJECTION, SOLUTION INTRAMUSCULAR; INTRAVENOUS at 11:51

## 2021-04-11 RX ADMIN — MIDAZOLAM HYDROCHLORIDE 1 MG: 1 INJECTION, SOLUTION INTRAMUSCULAR; INTRAVENOUS at 12:02

## 2021-04-11 RX ADMIN — CEPHALEXIN 1000 MG: 500 CAPSULE ORAL at 11:02

## 2021-04-11 RX ADMIN — FENTANYL CITRATE 100 MCG: 50 INJECTION, SOLUTION INTRAMUSCULAR; INTRAVENOUS at 11:44

## 2021-04-11 ASSESSMENT — ENCOUNTER SYMPTOMS: ROS SKIN COMMENTS: +CYST

## 2021-04-11 ASSESSMENT — MIFFLIN-ST. JEOR: SCORE: 2262.56

## 2021-04-11 NOTE — ED PROVIDER NOTES
"  History   Chief Complaint:  Wound check     HPI   Americo Griffin is a 35 year old male with history of psychosis, depression, and asthma who presents with a lower back cyst that he noticed about 10 years ago. He states that he had a relationship with a female and begin to develop various skin cyst but this particular site never went away. The last few days this has become exteremly painful. Denies any antibiotic use for this.     Review of Systems   Skin:        +Cyst   All other systems reviewed and are negative.    Allergies:  The patient has no known drug allergies.     Medications:  Beclomethasone  Tessalon  Albuterol  Flexeril    Past Medical History:    Depression  Ureteral stone  Psychosis  Suicidal ideation  Hydronephrosis of left kidney  Asthma  MONIE    Family History:    Mother: Diabetes, cancer    Social History:  Patient presents to the ED alone.  The patient does not describe any recent boils requiring I&D.  He attributes some of his past boils to her relationship in the past.    Physical Exam     Patient Vitals for the past 24 hrs:   BP Temp Temp src Pulse Resp SpO2 Height Weight   04/11/21 1207 -- -- -- -- 16 -- -- --   04/11/21 1205 (!) 135/94 -- -- 88 -- 98 % -- --   04/11/21 1200 (!) 178/136 -- -- 86 -- 98 % -- --   04/11/21 1155 (!) 114/93 -- -- 81 -- 99 % -- --   04/11/21 1154 (!) 143/106 -- -- -- -- 100 % -- --   04/11/21 1150 (!) 143/106 -- -- 97 -- 100 % -- --   04/11/21 1147 (!) 133/96 97  F (36.1  C) Temporal 92 16 99 % -- --   04/11/21 0935 (!) 150/102 98  F (36.7  C) Oral -- 16 97 % 1.803 m (5' 11\") 131.5 kg (290 lb)       Physical Exam  General: Resting comfortably on the gurney  Head:  The scalp, face, and head appear normal  Eyes:  The pupils are normal    Conjunctivae and sclera appear normal  ENT:    The nose is normal    Ears/pinnae are normal  Neck:  Normal range of motion  Skin:  5 cm wide by 8 cm tall area of induration to the subcutaneous tissues.    There is a 1 mm hair " follicle in the center that appears red and exquisitely tender. This is    10 cm superior to the superior gluteal cleft and does not represent a pilonidal cyst.  Neuro: Speech is normal and fluent  Psych:  Awake. Alert.  Normal affect.      Appropriate interactions    Emergency Department Course     Imaging:  POC US Soft Tissue: by Dr. Vishnu Perera  The soft tissues were evaluated.     1.  Cellulitis: No   2.  Abscess identified: Yes.  At least 3x2 cm   There are some internal echoes to suggest some debris or sebaceous components    Laboratory:  Wound Culture Aerobic Bacterial: Pending     [facility]    Procedure: Sedation    Date/Time: 4/11/2021 10:32 AM  Performed by: Vishnu Perera MD  Authorized by: Vishnu Perera MD     UNIVERSAL PROTOCOL   Site Marked: Yes  Prior Images Obtained and Reviewed:  Yes  Required items: Required blood products, implants, devices and special equipment available    Patient identity confirmed:  Verbally with patient and arm band  Patient was reevaluated immediately before administering moderate or deep sedation or anesthesia  Confirmation Checklist:  Patient's identity using two indicators, relevant allergies, procedure was appropriate and matched the consent or emergent situation and correct equipment/implants were available  Time out: Immediately prior to the procedure a time out was called    Universal Protocol: the Joint Commission Universal Protocol was followed    Preparation: Patient was prepped and draped in usual sterile fashion           ANESTHESIA    Local Anesthetic: Lidocaine 1% with epinephrine and bupivacaine 0.25% with epinephrine  Anesthetic Total (mL):  16      SEDATION    Patient Sedated: Yes    Sedation Type:  Moderate (conscious) sedation  Sedation:  See MAR for details, midazolam and fentanyl  Vital signs: Vital signs monitored during sedation    PROCEDURE   Patient Tolerance:  Patient tolerated the procedure well with no immediate  "complications  Describe Procedure: Sedation was maintained until the procedure was complete. The patient was monitored by staff until recovered.  Length of time physician/provider present for 1:1 monitoring during sedation: 20         Back Abscess Incision and Drainage     LOCATIONS:  Lower Back     ANESTHESIA:  Local field block using Lidocaine 1% with epinephrine and Marcaine 0.25% with  epinephrine, total of 8 mLs each.      PREPARATION:  Cleansed with Betadine     PROCEDURE:  Area was incised with # 11 Blade ([place]) with a Single Straight incision.  Wound treatment included extensive infected sebaceous material.  Packing consisted of Plain Gauze.  Appropriate dressing was applied to cover the area.    PATIENT STATUS:        Patient tolerated the procedure well. There were no complications.    Emergency Department Course:    Reviewed:  I reviewed nursing notes, vitals, past medical history and care everywhere    Assessments:  1006 I obtained history and examined the patient as noted above.     1057 I rechecked the patient and explained findings.     1112 Rechecked the patient.     1220 Updated and discharged.     Interventions:  1102 Keflex 1,000 mg PO  1144 Sublimaze 100 mcg IV  1151 Versed 2 mg IV  1157 Sublimaze 50 mcg IV  1202 Versed 1 mg IV    Disposition:  The patient was discharged to home.     Impression & Plan     Medical Decision Making:  Patient presents with at least a 10-year history of a \"cyst\" involving the midline low back, about 10 cm above the superior aspect of the superior gluteal cleft.  Patient notes that he has felt this but it is generally not bothered him.  Recently this presumably became infected as it became significantly painful.  He came in for definitive care.  On ultrasound there appeared to be a fluid component but there also appeared to be a heterogeneous component as well.  This was at least 3 x 2 cm but likely slightly larger.  The patient underwent incision and drainage " and I was able to extrude a significant amount of sebaceous hard material.  There is also a fair amount of brown fluid that drained.  Bleeding was minimal.  This was packed.  There is no surrounding cellulitis.  This most likely represents an infected sebaceous cyst from an irritated hair follicle leading to this.  Patient will be discharged on Keflex and sulfa in the event that this is MRSA, although I did not have that appearance.  Wound culture is pending at this time.  Packing can be removed in 1 to 2 days.      Diagnosis:    ICD-10-CM    1. Sebaceous cyst  L72.3    2. Cutaneous abscess of back excluding buttocks  L02.212        Discharge Medications:  New Prescriptions    CEPHALEXIN (KEFLEX) 500 MG CAPSULE    Take 1 capsule (500 mg) by mouth 4 times daily for 7 days    HYDROCODONE-ACETAMINOPHEN (NORCO) 5-325 MG TABLET    Take 1 tablet by mouth every 6 hours as needed for severe pain    SULFAMETHOXAZOLE-TRIMETHOPRIM (BACTRIM DS) 800-160 MG TABLET    Take 1 tablet by mouth 2 times daily for 7 days       Scribe Disclosure:  CHANA, Ronaldo Reza, am serving as a scribe at 10:03 AM on 4/11/2021 to document services personally performed by Vishnu Perera MD based on my observations and the provider's statements to me.                Vishnu Perera MD  04/11/21 0197

## 2021-04-11 NOTE — PROGRESS NOTES
Respiratory Care note - At bedside for conscious sedation. O2/NC on @ 2 LPM. SpO2 98 - 99% throughout procedure. ETCO2 35 - 45 throughout procedure, RR 12 - 14.

## 2021-04-11 NOTE — SEDATION DOCUMENTATION
Procedure ended, patient tolerated well. Large amount of pustulant drainage removed and packed with packing strip. Patient is awake and alert.

## 2021-04-12 NOTE — RESULT ENCOUNTER NOTE
[facility] ED discharge antibiotic (if prescribed):  [1] Sulfamethoxazole-Trimethoprim (Bactrim DS, Septra DS) 800-160 mg PO tablet,  1 tablet by mouth 2 times daily for 7 days AND [2] Cephalexin (Keflex) 500 mg capsule, 1 capsule (500 mg) by mouth 4 times daily for 7 days.  Incision and Drainage performed in Emergency Dept [Yes / No] : Yes  No changes in treatment per [facility] ED lab result culture protocol.

## 2021-04-16 LAB
BACTERIA SPEC CULT: ABNORMAL
BACTERIA SPEC CULT: ABNORMAL
SPECIMEN SOURCE: ABNORMAL

## 2021-04-16 NOTE — RESULT ENCOUNTER NOTE
Final Wound culture (sebaceous cyst) report on 4/16/21.  [facility] Emergency Dept discharge antibiotic prescribed: [1] Sulfamethoxazole-Trimethoprim (Bactrim DS, Septra DS) 800-160 mg PO tablet,  1 tablet by mouth 2 times daily for 7 days AND [2] Cephalexin (Keflex) 500 mg capsule, 1 capsule (500 mg) by mouth 4 times daily for 7 days.  #1. Bacteria, Heavy growth Staphylococcus lugdenensis, which is [SUSCEPTIBLE] to antibiotic(s)  Incision and Drainage performed in Emergency Dept [Yes / No]: yes  Recommendations in treatment per [facility] ED lab result Culture protocol.

## 2021-04-16 NOTE — RESULT ENCOUNTER NOTE
Left voicemail message requesting a call back to [facility] ED Lab Result RN at [phone].  RN is available every day between 10 a.m. and 6:30 p.m.  See Telephone encounter.

## 2022-04-08 NOTE — DISCHARGE INSTRUCTIONS
You had an infected sebaceous cyst with abscess.  A culture is pending.  You will be placed on antibiotics for 1 week.  The packing may be removed in 1 to 2 days.   No